# Patient Record
Sex: FEMALE | Race: WHITE | NOT HISPANIC OR LATINO
[De-identification: names, ages, dates, MRNs, and addresses within clinical notes are randomized per-mention and may not be internally consistent; named-entity substitution may affect disease eponyms.]

---

## 2022-05-03 ENCOUNTER — APPOINTMENT (OUTPATIENT)
Dept: ORTHOPEDIC SURGERY | Facility: CLINIC | Age: 27
End: 2022-05-03
Payer: COMMERCIAL

## 2022-05-03 ENCOUNTER — RESULT REVIEW (OUTPATIENT)
Age: 27
End: 2022-05-03

## 2022-05-03 VITALS — HEIGHT: 61 IN | BODY MASS INDEX: 24.55 KG/M2 | WEIGHT: 130 LBS

## 2022-05-03 DIAGNOSIS — M54.50 LOW BACK PAIN, UNSPECIFIED: ICD-10-CM

## 2022-05-03 DIAGNOSIS — M79.18 MYALGIA, OTHER SITE: ICD-10-CM

## 2022-05-03 DIAGNOSIS — M41.9 SCOLIOSIS, UNSPECIFIED: ICD-10-CM

## 2022-05-03 PROBLEM — Z00.00 ENCOUNTER FOR PREVENTIVE HEALTH EXAMINATION: Status: ACTIVE | Noted: 2022-05-03

## 2022-05-03 PROCEDURE — 99204 OFFICE O/P NEW MOD 45 MIN: CPT

## 2022-05-03 NOTE — DISCUSSION/SUMMARY
[de-identified] : Prescribed Medrol and Naproxen for pain and inflammation'\par \par Follow up with Dr. Arita after MRI of Lumbar Spine to eval HNP \par \par -----------------------------------------------\par Home Exercise\par The patient is instructed on a home exercise program.\par \par SHAYE DU Acting as a Scribe for Dr. Rodas\par I, Shaye Du, attest that this documentation has been prepared under the direction and in the presence of Provider Wilber Rodas MD.\par \par Activity Modification\par The patient was advised to modify their activities.\par \par Dx / Natural History\par The patient was advised of the diagnosis.  The natural history of the pathology was explained in full to the patient in layman's terms.  Several different treatment options were discussed and explained in full to the patient including the risks and benefits of both surgical and non-surgical treatments.  All questions and concerns were answered.\par \par Pain Guide Activities\par The patient was advised to let pain guide the gradual advancement of activities.\par \par RICE\par I explained to the patient that rest, ice, compression, and elevation would benefit them.  They may return to activity after follow-up or when they no longer have any pain.

## 2022-05-03 NOTE — HISTORY OF PRESENT ILLNESS
[de-identified] : The patient is a 26 year old right hand dominant  female who presents today complaining of lower back pain.  \par Date of Injury/Onset: \par Pain:    At Rest: 6/10 \par With Activity:  10/10 \par Mechanism of injury: while lifting a weight patient squatted and felt a pop\par This is not a Work Related Injury being treated under Worker's Compensation.\par This is  not an athletic injury occurring associated with an interscholastic or organized sports team.\par Quality of symptoms:  throbbing and sharp\par Improves with: meloxicam \par Worse with:  bending and sitting\par Prior treatment: Urgent care\par Prior Imaging: LIJ Xray\par Out of work/sport:  \par School/Sport/Position/Occupation: teacher\par Additional Information: None\par  \par

## 2022-05-03 NOTE — PHYSICAL EXAM
[de-identified] : Neurologic: normal coordination, normal DTR UE/LE , normal sensation, normal mood and affect, orientated and able to communicate. \par Skin: normal skin, no rash, no ulcers and no lesions. \par Lymphatic: no obvious lymphadenopathy in areas examined. \par Constitutional: well developed and well nourished. \par Cardiovascular: peripheral vascular exam is grossly normal. \par Pulmonary: no respiratory distress, lungs clear to auscultation bilaterally. \par Abdomen: normal bowel sounds, non-tender, no HSM and no mass. \par \par Lumbar Spine: X-ray 2 view: scoliosis\par Lumbar Spine: Pain with straight leg raise\par Radicular pain to left side\par Central spinal tenderness \par

## 2022-05-24 ENCOUNTER — APPOINTMENT (OUTPATIENT)
Dept: PAIN MANAGEMENT | Facility: CLINIC | Age: 27
End: 2022-05-24
Payer: COMMERCIAL

## 2022-05-24 VITALS — BODY MASS INDEX: 24.55 KG/M2 | WEIGHT: 130 LBS | HEIGHT: 61 IN

## 2022-05-24 DIAGNOSIS — M51.26 OTHER INTERVERTEBRAL DISC DISPLACEMENT, LUMBAR REGION: ICD-10-CM

## 2022-05-24 DIAGNOSIS — M54.16 RADICULOPATHY, LUMBAR REGION: ICD-10-CM

## 2022-05-24 PROCEDURE — 99204 OFFICE O/P NEW MOD 45 MIN: CPT

## 2022-05-25 NOTE — PHYSICAL EXAM
[de-identified] : Constitutional: \par - No acute distress \par - Well developed; well nourished \par \par Neurological: \par - normal mood and affect \par - alert and oriented x 3  \par \par Cardiovascular: \par - grossly normal\par \par Lumbar Spine Exam: \par \par Inspection: \par erythema (-) \par ecchymosis (-) \par rashes (-) \par alignment: no scoliosis\par \par Palpation:  \par paraspinal tenderness:                  L (-) ; R (-) \par thoracic paraspinal tenderness:    L (-) ; R (-) \par sciatic nerve tenderness :             L (-) ; R (-) \par SI joint tenderness:                        L (-) ; R (-) \par GTB tenderness:                            L (-);  R (-) \par \par ROM:   \par Full ROM with mild stiffness \par Pain with flexion \par \par Strength:                   Right       Left    \par Hip Flexion:                (5/5)       (5/5) \par Quadriceps:               (5/5)       (5/5) \par Hamstrings:                (5/5)       (5/5) \par Ankle Dorsiflexion:     (5/5)       (5/5) \par EHL:                            (5/5)       (5/5) \par Ankle Plantarflexion:  (5/5)       (5/5) \par \par \par Special Tests: \par SLR:                      R (+) ; L (+) \par Facet loading:       R (-) ; L (-) \par ADAM test:          R (-) ; L (-) \par XSLR:                   R (-) ; L (-) \par Ikrk's test:        R (-) ; L(-) \par Tight Hamstrings   R (-) ; L (-) \par \par Neurologic: \par Right sided parasthesia at L5,S1  \par Reflexes normal and symmetric \par \par Gait: \par non- antalgic gait

## 2022-05-25 NOTE — HISTORY OF PRESENT ILLNESS
[Lower back] : lower back [6] : 6 [4] : 4 [Meds] : meds [Heat] : heat [Sitting] : sitting [Standing] : standing [FreeTextEntry1] : The patient presents for initial evaluation regarding their low back pain.  Patient was referred by Dr. Rodas. Patient c/o of some pain to the low back with associated paraesthesia to the right lower extremity, specifically to the right top foot. She reports sudden onset of symptoms while she was squatting. She reports she heard a "pop" to the low back. Patient reports no back pain prior to the incident. Patient reports some weakness to the right leg. Patient reports completing MDP. \par \par DOI: 05/03/2022 \par \par Subjective weakness: No \par Lower extremity paresthesias: Yes\par Bladder/bowel dysfunction: No \par  \par Injections: No \par  \par Pertinent Surgical History: N/A \par  \par Imaging: \par MRI Lumbar Spine (05/03/22) -  [facility]\par \par T12-L1: No evidence of disc bulge, protrusion or extrusion. No central canal, subarticular or neural foraminal narrowing is noted. There is no significant facet arthropathy.\par L1-L2: No evidence of disc bulge, protrusion or extrusion. No central canal, subarticular or neural foraminal narrowing is noted. There is no significant facet arthropathy.\par L2-L3: No evidence of disc bulge, protrusion or extrusion. No central canal, subarticular or neural foraminal narrowing is noted. There is no significant facet arthropathy.\par L3-L4: No evidence of disc bulge, protrusion or extrusion. No central canal, subarticular or neural foraminal narrowing is noted. There is no significant facet arthropathy.\par L4-5: Left paracentral protruded disc herniation with annular tear with mild thecal sac compression. Left subarticular stenosis encroaching on the left descending L5 nerve root.\par L5-S1: Left paracentral left subarticular protruded disc herniation with mild thecal sac compression and left subarticular stenosis encroaching on the left descending S1 nerve root. Mild bilateral facet arthrosis.\par  \par Physician Disclaimer: I have personally reviewed and confirmed all HPI data with the patient. [] : no [FreeTextEntry7] : Right lower extremity  [FreeTextEntry9] : NSAIDs [de-identified] : L-Spine MRI - [facility]

## 2022-05-25 NOTE — ASSESSMENT
[FreeTextEntry1] : A discussion regarding available pain management treatment options occurred with the patient.  These included interventional, rehabilitative, pharmacological, and alternative modalities. We will proceed with the following: \par \par Interventional treatment options: \par - Proceed with right PM L5-S1 LESI with fluoroscopic guidance \par - see additional instructions below \par \par Rehabilitative options: \par - initiate trial of physical therapy \par - participation in active HEP was discussed \par \par Medication based treatment options: \par - completed MDP  \par - continue Naprosyn 500 BID PRN \par - see additional instructions below \par \par Complementary treatment options: \par - lifestyle modifications discussed \par - See additional instructions below \par \par Additional treatment recommendations as follows: \par - counseled as to red flag signs and when to seek urgent care \par - Follow up 1-2 weeks post injection for assessment of efficacy and further recommendations.\par \par We have discussed the risks, benefits, and alternatives NSAID therapy including but not limited to the risk of bleeding, thrombosis, gastric mucosal irritation/ulceration, allergic reaction and kidney dysfunction; the patient verbalizes an understanding.\par \par The risks, benefits and alternatives of the proposed procedure were explained in detail with the patient. The risks outlined include but are not limited to infection, bleeding, post- dural puncture headache, nerve injury, a temporary increase in pain, failure to resolve symptoms, allergic reaction, and possible elevation of blood sugar in diabetics if using corticosteroid.  All questions were answered to patient's apparent satisfaction and he/she verbalized an understanding.\par \par The documentation recorded by the scribe, in my presence, accurately reflects the service I personally performed and the decisions made by me with my edits as appropriate.\par \par I, Robin Hunt acting as scribe, attest that this documentation has been prepared under the direction and in the presence of Provider Heraclio Arita DO.

## 2023-08-07 ENCOUNTER — APPOINTMENT (OUTPATIENT)
Dept: CARDIOLOGY | Facility: CLINIC | Age: 28
End: 2023-08-07

## 2023-08-08 ENCOUNTER — APPOINTMENT (OUTPATIENT)
Dept: CARDIOLOGY | Facility: CLINIC | Age: 28
End: 2023-08-08
Payer: COMMERCIAL

## 2023-08-08 ENCOUNTER — NON-APPOINTMENT (OUTPATIENT)
Age: 28
End: 2023-08-08

## 2023-08-08 VITALS
BODY MASS INDEX: 24.55 KG/M2 | WEIGHT: 130 LBS | HEIGHT: 61 IN | OXYGEN SATURATION: 96 % | SYSTOLIC BLOOD PRESSURE: 124 MMHG | HEART RATE: 85 BPM | DIASTOLIC BLOOD PRESSURE: 76 MMHG

## 2023-08-08 DIAGNOSIS — Z83.3 FAMILY HISTORY OF DIABETES MELLITUS: ICD-10-CM

## 2023-08-08 DIAGNOSIS — Z87.891 PERSONAL HISTORY OF NICOTINE DEPENDENCE: ICD-10-CM

## 2023-08-08 DIAGNOSIS — Z78.9 OTHER SPECIFIED HEALTH STATUS: ICD-10-CM

## 2023-08-08 DIAGNOSIS — R00.0 TACHYCARDIA, UNSPECIFIED: ICD-10-CM

## 2023-08-08 DIAGNOSIS — Z82.49 FAMILY HISTORY OF ISCHEMIC HEART DISEASE AND OTHER DISEASES OF THE CIRCULATORY SYSTEM: ICD-10-CM

## 2023-08-08 DIAGNOSIS — Z83.438 FAMILY HISTORY OF OTHER DISORDER OF LIPOPROTEIN METABOLISM AND OTHER LIPIDEMIA: ICD-10-CM

## 2023-08-08 PROCEDURE — 99204 OFFICE O/P NEW MOD 45 MIN: CPT | Mod: 25

## 2023-08-08 PROCEDURE — 93000 ELECTROCARDIOGRAM COMPLETE: CPT

## 2023-08-08 RX ORDER — NAPROXEN 500 MG/1
500 TABLET ORAL
Qty: 60 | Refills: 0 | Status: DISCONTINUED | COMMUNITY
Start: 2022-05-04 | End: 2023-08-08

## 2023-08-08 RX ORDER — NORETHINDRONE ACETATE AND ETHINYL ESTRADIOL 1.5; 3 MG/1; UG/1
1.5-3 TABLET ORAL
Qty: 63 | Refills: 0 | Status: ACTIVE | COMMUNITY
Start: 2023-04-17

## 2023-08-08 RX ORDER — PROPRANOLOL HYDROCHLORIDE 20 MG/1
20 TABLET ORAL EVERY 6 HOURS
Refills: 0 | Status: ACTIVE | COMMUNITY
Start: 2023-03-31

## 2023-08-08 RX ORDER — METHOCARBAMOL 500 MG/1
500 TABLET, FILM COATED ORAL TWICE DAILY
Qty: 60 | Refills: 0 | Status: DISCONTINUED | COMMUNITY
Start: 2022-05-04 | End: 2023-08-08

## 2023-08-08 RX ORDER — METHYLPREDNISOLONE 4 MG/1
4 TABLET ORAL
Qty: 1 | Refills: 0 | Status: DISCONTINUED | COMMUNITY
Start: 2022-05-03 | End: 2023-08-08

## 2023-08-14 PROBLEM — R00.0 TACHYCARDIA: Status: ACTIVE | Noted: 2023-08-08

## 2023-08-14 NOTE — ASSESSMENT
[FreeTextEntry1] : 27 y/o F with no PMH presents for evaluation of dizziness and palpitations.   1) Palpitations with associated dizziness  - with dizziness she notes associated lightheadedness  - EKG reviewed shows Sinus Rhythm @ 84 bpm WITHIN NORMAL LIMITS - TTE done with PMD shows LVEF 65% with trace and pulmonary regurgitation - TSH done in 2022, normal with 1.49 - US carotid shows no evidence of stenosis or atherosclerosis  - will refer for MCOT and repeat TSH   Esperanza Noel D.O. [facility] Cardiology/Vascular Cardiology -[facility] Cardiology Telephone # [phone]

## 2023-08-14 NOTE — REASON FOR VISIT
[Symptom and Test Evaluation] : symptom and test evaluation [FreeTextEntry1] : 29 y/o F with no PMH presents for evaluation of dizziness and palpitations.   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

## 2023-10-10 ENCOUNTER — APPOINTMENT (OUTPATIENT)
Dept: CARDIOLOGY | Facility: CLINIC | Age: 28
End: 2023-10-10
Payer: COMMERCIAL

## 2023-10-10 ENCOUNTER — NON-APPOINTMENT (OUTPATIENT)
Age: 28
End: 2023-10-10

## 2023-10-10 VITALS
HEIGHT: 61 IN | SYSTOLIC BLOOD PRESSURE: 106 MMHG | WEIGHT: 132 LBS | DIASTOLIC BLOOD PRESSURE: 71 MMHG | HEART RATE: 96 BPM | OXYGEN SATURATION: 95 % | BODY MASS INDEX: 24.92 KG/M2

## 2023-10-10 DIAGNOSIS — F41.9 ANXIETY DISORDER, UNSPECIFIED: ICD-10-CM

## 2023-10-10 DIAGNOSIS — R42 DIZZINESS AND GIDDINESS: ICD-10-CM

## 2023-10-10 DIAGNOSIS — R00.2 PALPITATIONS: ICD-10-CM

## 2023-10-10 PROCEDURE — 99214 OFFICE O/P EST MOD 30 MIN: CPT | Mod: 25

## 2023-10-10 PROCEDURE — 93000 ELECTROCARDIOGRAM COMPLETE: CPT

## 2023-10-10 RX ORDER — ESCITALOPRAM OXALATE 20 MG/1
20 TABLET, FILM COATED ORAL DAILY
Refills: 0 | Status: ACTIVE | COMMUNITY

## 2024-07-29 ENCOUNTER — APPOINTMENT (OUTPATIENT)
Dept: CARDIOLOGY | Facility: CLINIC | Age: 29
End: 2024-07-29

## 2024-07-29 VITALS
BODY MASS INDEX: 25.3 KG/M2 | OXYGEN SATURATION: 98 % | HEIGHT: 61 IN | HEART RATE: 81 BPM | DIASTOLIC BLOOD PRESSURE: 63 MMHG | WEIGHT: 134 LBS | SYSTOLIC BLOOD PRESSURE: 99 MMHG

## 2024-07-29 DIAGNOSIS — R42 DIZZINESS AND GIDDINESS: ICD-10-CM

## 2024-07-29 PROCEDURE — 99214 OFFICE O/P EST MOD 30 MIN: CPT | Mod: 25

## 2024-07-29 PROCEDURE — 93000 ELECTROCARDIOGRAM COMPLETE: CPT

## 2024-07-29 NOTE — REASON FOR VISIT
[Other: ____] : [unfilled] [FreeTextEntry1] : feels head fullness with doing high intensity training and feels head fullness and lightheqadedness and dizziness  3 weeks ago doing high internsty traingin  COVID twice 2019 and 2022   Tired sluggish and feeling not feeling trh head fullness

## 2024-07-29 NOTE — ADDENDUM
[FreeTextEntry1] : Patient was seen and examined during the visit and agree with above plan.   Esperanza Noel D.O. [facility] Cardiology/Vascular Cardiology -[facility] Cardiology Telephone # [phone]

## 2024-07-29 NOTE — DISCUSSION/SUMMARY
[FreeTextEntry1] : A/P  1. Palpitations, lightheadedness - EKG today , NSR , no ectopy - Event monitor revealed low burden PVC <1% - reports TSH performed with PCP was WNL -discussed the option of starting  propranolol 60 mg 1/2 tab daily, or using short acting propranolol 20 mg PRN when symptomatic- pt will trial the 20 mg and use as needed, she is advised to call us if she does not find the propranolol helpful -advised to avoid triggers, including alcohol, caffeine, encouraged to maintain good oral hydration and good sleep habits  - management of stress and anxiety - on Lexapro  Follow-up with Dr. Noel in 3 months Discussed with Dr. Bert Valle PA-C

## 2024-07-29 NOTE — PHYSICAL EXAM
[Well Developed] : well developed [Well Nourished] : well nourished [No Acute Distress] : no acute distress [Normal Conjunctiva] : normal conjunctiva [Normal Venous Pressure] : normal venous pressure [No Carotid Bruit] : no carotid bruit [Normal S1, S2] : normal S1, S2 [No Murmur] : no murmur [No Rub] : no rub [No Gallop] : no gallop [Clear Lung Fields] : clear lung fields [Good Air Entry] : good air entry [No Respiratory Distress] : no respiratory distress  [Soft] : abdomen soft [Non Tender] : non-tender [Normal Bowel Sounds] : normal bowel sounds [Normal Gait] : normal gait [No Edema] : no edema [No Cyanosis] : no cyanosis [No Clubbing] : no clubbing [No Varicosities] : no varicosities [Moves all extremities] : moves all extremities [No Focal Deficits] : no focal deficits [Normal Speech] : normal speech [Alert and Oriented] : alert and oriented [Normal memory] : normal memory

## 2024-07-29 NOTE — HISTORY OF PRESENT ILLNESS
[FreeTextEntry1] : 29 y/o F with no PMH presents for evaluation of dizziness and palpitations.   pt continues to experience palpitations and is using 1/2 propranolol 20 mg aprox 4-5 nights  / week, not using when symptomatic  She had an episode of palpitations in bed Sunday night after 3 alcoholic beverages- whiskey and ginger ale today she had a cold brew this morning and this afternoon  during a meeting in a warm room developed palpitations which came and went for an hour and felt a throbbing in the left arm She exercises at the gym 3-4  days week, weights / cardio, at times gets palpitations and dizziness and has to stop her exercise but denies any dyspnea or chest pain She was started on Lexapro 2 weeks ago and overall feels better, less anxious  Denies any other symptoms, including chest pain, SOB/ROJAS, PND, orthopnea, LE edema, near syncope, syncope   Prior visit 8/8/2023  Patient notes that she has had Palpitations - 2 twice with dizziness or lightheadedness and shortness of breath  no orthopnea or PND, lower extremity edema   quit smoking 9yrs ago and was smoking 1ppd  socially drinks alcohol  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

## 2024-07-29 NOTE — REVIEW OF SYSTEMS
[Palpitations] : palpitations [Negative] : Psychiatric [SOB] : no shortness of breath [Dyspnea on exertion] : not dyspnea during exertion [Chest Discomfort] : no chest discomfort [Lower Ext Edema] : no extremity edema [Leg Claudication] : no intermittent leg claudication [Orthopnea] : no orthopnea [PND] : no PND [Syncope] : no syncope [Cough] : no cough [Wheezing] : no wheezing [Coughing Up Blood] : no hemoptysis [Blood in Stool] : no blood in stool [Easy Bleeding] : no tendency for easy bleeding

## 2024-08-06 ENCOUNTER — APPOINTMENT (OUTPATIENT)
Dept: CARDIOLOGY | Facility: CLINIC | Age: 29
End: 2024-08-06

## 2024-08-06 PROCEDURE — 93880 EXTRACRANIAL BILAT STUDY: CPT

## 2024-08-06 PROCEDURE — 93306 TTE W/DOPPLER COMPLETE: CPT

## 2024-09-04 ENCOUNTER — APPOINTMENT (OUTPATIENT)
Dept: CARDIOLOGY | Facility: CLINIC | Age: 29
End: 2024-09-04

## 2024-09-16 ENCOUNTER — NON-APPOINTMENT (OUTPATIENT)
Age: 29
End: 2024-09-16

## 2024-10-02 ENCOUNTER — EMERGENCY (EMERGENCY)
Facility: HOSPITAL | Age: 29
LOS: 1 days | Discharge: HOME | End: 2024-10-02
Attending: EMERGENCY MEDICINE | Admitting: HOSPITALIST
Payer: COMMERCIAL

## 2024-10-02 VITALS
OXYGEN SATURATION: 100 % | SYSTOLIC BLOOD PRESSURE: 116 MMHG | HEART RATE: 75 BPM | TEMPERATURE: 98 F | DIASTOLIC BLOOD PRESSURE: 77 MMHG | RESPIRATION RATE: 18 BRPM

## 2024-10-02 VITALS
TEMPERATURE: 98 F | HEART RATE: 74 BPM | DIASTOLIC BLOOD PRESSURE: 79 MMHG | HEIGHT: 61 IN | WEIGHT: 134.92 LBS | OXYGEN SATURATION: 98 % | SYSTOLIC BLOOD PRESSURE: 124 MMHG | RESPIRATION RATE: 16 BRPM

## 2024-10-02 PROCEDURE — 99284 EMERGENCY DEPT VISIT MOD MDM: CPT | Mod: 25

## 2024-10-02 PROCEDURE — 70450 CT HEAD/BRAIN W/O DYE: CPT | Mod: 26,MC

## 2024-10-02 PROCEDURE — 70450 CT HEAD/BRAIN W/O DYE: CPT | Mod: MC

## 2024-10-02 PROCEDURE — 99284 EMERGENCY DEPT VISIT MOD MDM: CPT

## 2024-10-02 RX ORDER — MECLIZINE HCL 12.5 MG
1 TABLET ORAL
Qty: 21 | Refills: 0
Start: 2024-10-02 | End: 2024-10-08

## 2024-10-02 RX ORDER — MECLIZINE HCL 12.5 MG
25 TABLET ORAL ONCE
Refills: 0 | Status: COMPLETED | OUTPATIENT
Start: 2024-10-02 | End: 2024-10-02

## 2024-10-02 RX ADMIN — Medication 25 MILLIGRAM(S): at 21:08

## 2024-10-07 ENCOUNTER — TRANSCRIPTION ENCOUNTER (OUTPATIENT)
Age: 29
End: 2024-10-07